# Patient Record
Sex: MALE
[De-identification: names, ages, dates, MRNs, and addresses within clinical notes are randomized per-mention and may not be internally consistent; named-entity substitution may affect disease eponyms.]

---

## 2017-04-01 ENCOUNTER — HOSPITAL ENCOUNTER (EMERGENCY)
Dept: HOSPITAL 1 - ED | Age: 37
Discharge: HOME | End: 2017-04-01
Payer: COMMERCIAL

## 2017-04-01 VITALS — DIASTOLIC BLOOD PRESSURE: 77 MMHG | SYSTOLIC BLOOD PRESSURE: 111 MMHG

## 2017-04-01 DIAGNOSIS — N20.1: Primary | ICD-10-CM

## 2017-04-01 DIAGNOSIS — K57.92: ICD-10-CM

## 2017-04-01 DIAGNOSIS — Z79.899: ICD-10-CM

## 2017-04-01 DIAGNOSIS — I10: ICD-10-CM

## 2017-04-01 DIAGNOSIS — E11.9: ICD-10-CM

## 2017-04-01 DIAGNOSIS — Z88.8: ICD-10-CM

## 2017-04-01 LAB
ALBUMIN SERPL-MCNC: 4 G/DL (ref 3.4–5)
ALP SERPL-CCNC: 68 U/L (ref 46–116)
ALT SERPL-CCNC: 65 U/L (ref 16–63)
AST SERPL-CCNC: 18 U/L (ref 15–37)
BASOPHILS NFR BLD: 0.4 % (ref 0–2)
BILIRUB SERPL-MCNC: 0.56 MG/DL (ref 0.2–1)
BUN SERPL-MCNC: 16 MG/DL (ref 7–18)
CALCIUM SERPL-MCNC: 9.1 MG/DL (ref 8.5–10.1)
CHLORIDE SERPL-SCNC: 103 MMOL/L (ref 98–107)
CO2 SERPL-SCNC: 25.3 MMOL/L (ref 21–32)
CREAT SERPL-MCNC: 1.3 MG/DL (ref 0.7–1.3)
ERYTHROCYTE [DISTWIDTH] IN BLOOD BY AUTOMATED COUNT: 12.6 % (ref 11.5–14.5)
GFR SERPLBLD BASED ON 1.73 SQ M-ARVRAT: > 60 ML/MIN
GLUCOSE SERPL-MCNC: 153 MG/DL (ref 74–106)
MICROSCOPIC UR-IMP: YES
PLATELET # BLD: 417 X10^3MCL (ref 130–400)
POTASSIUM SERPL-SCNC: 3.9 MMOL/L (ref 3.5–5.1)
PROT SERPL-MCNC: 7.8 G/DL (ref 6.4–8.2)
RBC # UR STRIP.AUTO: (no result) /UL
SODIUM SERPL-SCNC: 139 MMOL/L (ref 136–145)
UA SPECIFIC GRAVITY: 1.02 (ref 1–1.03)

## 2017-04-09 ENCOUNTER — HOSPITAL ENCOUNTER (INPATIENT)
Dept: HOSPITAL 1 - ED | Age: 37
LOS: 3 days | Discharge: HOME | DRG: 391 | End: 2017-04-12
Attending: FAMILY MEDICINE | Admitting: FAMILY MEDICINE
Payer: COMMERCIAL

## 2017-04-09 VITALS
BODY MASS INDEX: 32.99 KG/M2 | WEIGHT: 210.17 LBS | WEIGHT: 210.17 LBS | HEIGHT: 67 IN | HEIGHT: 67 IN | BODY MASS INDEX: 32.99 KG/M2

## 2017-04-09 DIAGNOSIS — E83.51: ICD-10-CM

## 2017-04-09 DIAGNOSIS — D47.3: ICD-10-CM

## 2017-04-09 DIAGNOSIS — G47.30: ICD-10-CM

## 2017-04-09 DIAGNOSIS — R80.8: ICD-10-CM

## 2017-04-09 DIAGNOSIS — N39.0: ICD-10-CM

## 2017-04-09 DIAGNOSIS — E43: ICD-10-CM

## 2017-04-09 DIAGNOSIS — I16.0: ICD-10-CM

## 2017-04-09 DIAGNOSIS — R31.9: ICD-10-CM

## 2017-04-09 DIAGNOSIS — K57.20: Primary | ICD-10-CM

## 2017-04-09 DIAGNOSIS — Z87.891: ICD-10-CM

## 2017-04-09 DIAGNOSIS — Z88.8: ICD-10-CM

## 2017-04-09 DIAGNOSIS — N13.1: ICD-10-CM

## 2017-04-09 DIAGNOSIS — N13.4: ICD-10-CM

## 2017-04-09 DIAGNOSIS — E11.65: ICD-10-CM

## 2017-04-09 DIAGNOSIS — Z90.49: ICD-10-CM

## 2017-04-09 DIAGNOSIS — K76.0: ICD-10-CM

## 2017-04-09 DIAGNOSIS — N17.0: ICD-10-CM

## 2017-04-09 LAB
ALBUMIN SERPL-MCNC: 3.5 G/DL (ref 3.4–5)
ALP SERPL-CCNC: 64 U/L (ref 46–116)
ALT SERPL-CCNC: 38 U/L (ref 16–63)
AMYLASE SERPL-CCNC: 30 U/L (ref 25–115)
AST SERPL-CCNC: 10 U/L (ref 15–37)
BASOPHILS NFR BLD: 0.1 % (ref 0–2)
BILIRUB SERPL-MCNC: 0.57 MG/DL (ref 0.2–1)
BUN SERPL-MCNC: 16 MG/DL (ref 7–18)
CALCIUM SERPL-MCNC: 9.1 MG/DL (ref 8.5–10.1)
CHLORIDE SERPL-SCNC: 100 MMOL/L (ref 98–107)
CO2 SERPL-SCNC: 27.7 MMOL/L (ref 21–32)
CREAT SERPL-MCNC: 1.6 MG/DL (ref 0.7–1.3)
ERYTHROCYTE [DISTWIDTH] IN BLOOD BY AUTOMATED COUNT: 12.9 % (ref 11.5–14.5)
GFR SERPLBLD BASED ON 1.73 SQ M-ARVRAT: 51 ML/MIN
GLUCOSE SERPL-MCNC: 216 MG/DL (ref 74–106)
LIPASE SERPL-CCNC: 180 IU/L (ref 73–393)
MICROSCOPIC UR-IMP: YES
PLATELET # BLD: 420 X10^3MCL (ref 130–400)
POTASSIUM SERPL-SCNC: 4 MMOL/L (ref 3.5–5.1)
PROT SERPL-MCNC: 7.7 G/DL (ref 6.4–8.2)
RBC # UR STRIP.AUTO: (no result) /UL
SODIUM SERPL-SCNC: 136 MMOL/L (ref 136–145)
UA SPECIFIC GRAVITY: >=1.03 (ref 1–1.03)

## 2017-04-09 NOTE — NUR
PT AAOX4, ON GURNEY IN POSITION OF COMFORT. PT REPORTS RLQ PAIN. RESP EVEN AND
UNLABORED. SPS AT BEDSIDE, CALL LIGHT WITHIN REACH, WILL CONTINUE TO MONITOR.

## 2017-04-09 NOTE — NUR
RECEIVED A 37 YEAR OLD MALE IN ROOM 8 WITH C/O RIGHT LOWER QUADRANT PAIN SINCE
WEDNESDAY. PT DENIES VOMITING AND NAUSEA AT THIS TIME. HOWEVER REPORTS LOOSE
STOOLS. 
 PT AAOX4, RESP EVEN AND UNLABORED. FAMILY AT BEDSIDE. ON FULL CM.
 CALL LIGHT WITHIN REACH. WILL CONTINUE TO MONITOR.

## 2017-04-09 NOTE — NUR
PT ALERT AND ORIENTED. RESP EVEN AND UNLABORED. DENIES PAIN AT THIS TIME. SPS
AT BEDSIDE. WILL CONTINUE TO MONITOR.

## 2017-04-09 NOTE — NUR
PT ON CAROL, IN POSITION OF COMFORT. AAOX4, RESP EVEN AND UNLABORED. SPS AT
BEDSIDE. CALL LIGHT WITHIN REACH, WILL CONTINUE TO MONITOR.

## 2017-04-09 NOTE — NUR
PT AAOX4, RESP EVEN AND UNLABORED SPOUSE AT BEDSIDE. [facility],
WILL CONTINUE TO MONITOR.

## 2017-04-10 VITALS — DIASTOLIC BLOOD PRESSURE: 80 MMHG | SYSTOLIC BLOOD PRESSURE: 129 MMHG

## 2017-04-10 VITALS — DIASTOLIC BLOOD PRESSURE: 88 MMHG | SYSTOLIC BLOOD PRESSURE: 135 MMHG

## 2017-04-10 VITALS — DIASTOLIC BLOOD PRESSURE: 79 MMHG | SYSTOLIC BLOOD PRESSURE: 128 MMHG

## 2017-04-10 VITALS — SYSTOLIC BLOOD PRESSURE: 137 MMHG | DIASTOLIC BLOOD PRESSURE: 89 MMHG

## 2017-04-10 VITALS — DIASTOLIC BLOOD PRESSURE: 92 MMHG | SYSTOLIC BLOOD PRESSURE: 146 MMHG

## 2017-04-10 LAB
BASOPHILS NFR BLD: 0.1 % (ref 0–2)
BUN SERPL-MCNC: 16 MG/DL (ref 7–18)
CALCIUM SERPL-MCNC: 8.4 MG/DL (ref 8.5–10.1)
CHLORIDE SERPL-SCNC: 105 MMOL/L (ref 98–107)
CHOLEST SERPL-MCNC: 103 MG/DL (ref ?–200)
CHOLEST/HDLC SERPL: 2.7 MG/DL
CO2 SERPL-SCNC: 24.7 MMOL/L (ref 21–32)
CREAT SERPL-MCNC: 1.4 MG/DL (ref 0.7–1.3)
ERYTHROCYTE [DISTWIDTH] IN BLOOD BY AUTOMATED COUNT: 13 % (ref 11.5–14.5)
GFR SERPLBLD BASED ON 1.73 SQ M-ARVRAT: 60 ML/MIN
GLUCOSE SERPL-MCNC: 160 MG/DL (ref 74–106)
HDLC SERPL-MCNC: 38 MG/DL (ref 40–60)
LDH SERPL-CCNC: 141 U/L (ref 100–190)
MAGNESIUM SERPL-MCNC: 1.9 MG/DL (ref 1.8–2.4)
MAGNESIUM SERPL-MCNC: 1.9 MG/DL (ref 1.8–2.4)
PHOSPHATE SERPL-MCNC: 3.2 MG/DL (ref 2.5–4.9)
PHOSPHATE SERPL-MCNC: 3.6 MG/DL (ref 2.5–4.9)
PLATELET # BLD: 440 X10^3MCL (ref 130–400)
POTASSIUM SERPL-SCNC: 4 MMOL/L (ref 3.5–5.1)
SODIUM SERPL-SCNC: 138 MMOL/L (ref 136–145)
T3 SERPL-MCNC: 0.71 NG/ML
T3RU NFR SERPL: 37 % UPTAKE (ref 33–39)
T4 FREE SERPL-MCNC: 0.98 NG/DL (ref 0.76–1.46)
T4 SERPL-MCNC: 6 UG/DL (ref 4.7–13.3)
T4/T3 UPTAKE INDEX SERPL: 2.2 UG/DL (ref 1.4–4.5)
TRIGL SERPL-MCNC: 61 MG/DL (ref ?–150)

## 2017-04-10 NOTE — NUR
PATIENT OPTED OUT OF HAVING INSULIN  BLOOD SUGAR. HE IS CONCERNED THIS
MAY LOWER TOO MUCH HIS BLOOD SUGAR. REMAINS NPO AS ORDERED.

## 2017-04-10 NOTE — NUR
CONTINUED TO STRAIN FOR STONES AND SO FAR NONE NOTED. PATIENT HAS HAD TWO BMS
AND HAS INTERMITTANT PAIN NOT IN THE GUT BUT IN THE FLANK AREA AND BELIEVES IT
IS THE KIDNEY STONE CAUSING PAIN AND NOT THE DIVERTICULI.

## 2017-04-10 NOTE — NUR
RECEIVED PATIENT ALERT AND ORIENTED TIMES FOUR. PATIENT WITH IV ITNACT AND
SKIN IS WARM AND DRY. PATIENT DENIES PAIN AT THIST ANASTASIIA. SOME TRACE EDEMA TO
THE LOWER EXTREMTIES NOTED. AND PATIENT HAS BEEN STRAINING URINE AND URINE IS
[place] IN COLOR. SO FAR NO STONES NOTED. PATIENT HAS BEEN NPO AS ORDERE
FOR POSSIBLE PROCEDURE AND CONSULTATION WITH DR PERRY FOR DIVERTICULI. VITALS
ATH IS TIME AT 98.6, 83, 20, 137/89, 98%. LAST BLOOD SUGAR  AND NO
COVERAGE GIVEN AND PATIENT IS NPO AT THIS TIME. CONTINUED ON FLAGYL AND
LEVAQUIN AND NO ADVERSE REACTION. PATIEN TIWTH HISTORY OF SLEEP APNEA,
DIABETES, CHOLECYSTECTOMY, DIVERTICULITIS. PATIENT IN NO ACUTE DISTRESS AT
THIS TIME.

## 2017-04-10 NOTE — NUR
RECEIVED PT FROM ED [facility]IA [employer]. CAME IN DUE TO RLQ ABDOMINAL PAIN. AAOX4. NO
SOB NOTED. DENIES CHEST PAIN/PRESSURE, NSR ON THE MONITOR. DENIES ABDOMINAL
DISCOMFORT. HAS LOOSE STOOLS. VOIDS FREELY. IV SITE PATENT AND INTACT. SIDE
RAILS UPX2. CALL LIGHT ON REACH. ENDORSED TO PRIMARY NURSE MARKEL

## 2017-04-10 NOTE — NUR
SEEN IN BED STS PAIN TO RLQ IS 0/10 ON PAIN SCALE AFTER PAIN MEDS GIVEN IN ER.
DENIES NAUSEA. NPO X MEDS INFORMED. FLAGYL CONTINUED FROM ER, IV ACCESS TO LAC
INTACT AND PATENT.

## 2017-04-10 NOTE — NUR
NO ACUTE DISTRESS NOTED THROUGHOUT THE SHIFT. STS PAIN TO RLQ IS TOLERABLE, NO
PAIN MEDS GIVEN AFTER CAME UP FROM ER. NPO X MEDS. DUE ABX-FLAGYL AND LEVAQUIN
GIVEN. IVF D5NS CONTINUED AT 150ML/HR.

## 2017-04-10 NOTE — NUR
RESTING IN BED, NO ANY DISTRESS NOTED ON ROOM AIR. CPAP AT BEDSIDE PER RT STS
DOES NOT NEED IT AT THIS TIME. DOCTOR PAULY WENT IN TO THE ROOM MADE AWARE.
LEVAQUIN 500MG IVPB INFUSING WELL. URINAL PROVIDED, WILL STRAIN ALL URINE.
PLAN OF CARE DISCUSSED. PATIENT VERBALIZED UNDERSTANDING. IVF D5NS AT 150ML/HR
INFUSING.

## 2017-04-10 NOTE — NUR
CALLED INTERN FOR ORDERS AND PATIENT IS TO REMAIN NPO FOR GI TO SEE AS
PREVIOUSLY ORDERS. ADVISED THE PATIENT AND WILL CONTINUE NPO STATUS AT THIS
TIME.

## 2017-04-11 VITALS — SYSTOLIC BLOOD PRESSURE: 133 MMHG | DIASTOLIC BLOOD PRESSURE: 89 MMHG

## 2017-04-11 VITALS — SYSTOLIC BLOOD PRESSURE: 135 MMHG | DIASTOLIC BLOOD PRESSURE: 84 MMHG

## 2017-04-11 VITALS — SYSTOLIC BLOOD PRESSURE: 117 MMHG | DIASTOLIC BLOOD PRESSURE: 64 MMHG

## 2017-04-11 VITALS — DIASTOLIC BLOOD PRESSURE: 88 MMHG | SYSTOLIC BLOOD PRESSURE: 130 MMHG

## 2017-04-11 LAB
ALBUMIN SERPL-MCNC: 2.9 G/DL (ref 3.4–5)
BASOPHILS NFR BLD: 0.1 % (ref 0–2)
BUN SERPL-MCNC: 11 MG/DL (ref 7–18)
CALCIUM SERPL-MCNC: 8.5 MG/DL (ref 8.5–10.1)
CHLORIDE SERPL-SCNC: 105 MMOL/L (ref 98–107)
CO2 SERPL-SCNC: 26.2 MMOL/L (ref 21–32)
CREAT SERPL-MCNC: 1.2 MG/DL (ref 0.7–1.3)
ERYTHROCYTE [DISTWIDTH] IN BLOOD BY AUTOMATED COUNT: 12.9 % (ref 11.5–14.5)
GFR SERPLBLD BASED ON 1.73 SQ M-ARVRAT: > 60 ML/MIN
GLUCOSE SERPL-MCNC: 143 MG/DL (ref 74–106)
PLATELET # BLD: 459 X10^3MCL (ref 130–400)
POTASSIUM SERPL-SCNC: 3.7 MMOL/L (ref 3.5–5.1)
SODIUM SERPL-SCNC: 139 MMOL/L (ref 136–145)

## 2017-04-11 NOTE — NUR
Awake and verbally responsive. No resp.distress noted. Denies pain at this
time. Denies n/v or diarrhea or dysuria. Will cont.to monitor. Call light
within reach.

## 2017-04-11 NOTE — NUR
PATIENT IS RESTING IN BED. NO DISTRESS NOTED. IV FLUID INFUSING WELL. CALL
LIGHT WITHIN REACH. WILL CONTINUE TO MONITOR.

## 2017-04-11 NOTE — NUR
REASSESSMENT DONE. PT AWAKE, COOPERATIVE OF CARE. ON TELE. PALPABLE PULSES TO
ALL EXTREMITIES. DENIES PAIN AT MOMENT. PT IS AMBULATORY. IV INFUSING WELL TO
LAC D5NS AT 150ML/HR. CALL LIGHT WITHIN REACH. WILL CONTINUE TO MONITOR.

## 2017-04-11 NOTE — NUR
PASSED AFTERNOON MEDS. PT TOLERATED WELL. BLOOD SUGAR CHECK  NO INSULIN
COVERAGE NEEDED. PT SITTING IN BEDSIDE CHAIR TALKING TO FAMILY MEMBERS. NO
DISTRESS, DENIES PAIN. CALL LIGHT WITHIN REACH.

## 2017-04-12 VITALS — DIASTOLIC BLOOD PRESSURE: 78 MMHG | SYSTOLIC BLOOD PRESSURE: 120 MMHG

## 2017-04-12 VITALS — SYSTOLIC BLOOD PRESSURE: 141 MMHG | DIASTOLIC BLOOD PRESSURE: 92 MMHG

## 2017-04-12 LAB
ALBUMIN SERPL-MCNC: 2.8 G/DL (ref 3.4–5)
BASOPHILS NFR BLD: 0.3 % (ref 0–2)
BUN SERPL-MCNC: 9 MG/DL (ref 7–18)
CALCIUM SERPL-MCNC: 8.5 MG/DL (ref 8.5–10.1)
CHLORIDE SERPL-SCNC: 106 MMOL/L (ref 98–107)
CO2 SERPL-SCNC: 25.6 MMOL/L (ref 21–32)
CREAT SERPL-MCNC: 1.3 MG/DL (ref 0.7–1.3)
ERYTHROCYTE [DISTWIDTH] IN BLOOD BY AUTOMATED COUNT: 13 % (ref 11.5–14.5)
GFR SERPLBLD BASED ON 1.73 SQ M-ARVRAT: > 60 ML/MIN
GLUCOSE SERPL-MCNC: 163 MG/DL (ref 74–106)
PLATELET # BLD: 471 X10^3MCL (ref 130–400)
POTASSIUM SERPL-SCNC: 3.9 MMOL/L (ref 3.5–5.1)
SODIUM SERPL-SCNC: 141 MMOL/L (ref 136–145)

## 2017-04-12 NOTE — NUR
PATIENT AOX4, DENIES HEADACHE. NO TELE, DENIES CP. LUNGS CTA, NO RESP DISTRESS
NOTED ON RA. PERIPHERAL PULSES PALPABLE, NO EDEMA. BOWEL SOUNDS ACTIVE, LAST
STATED YESTERDAY, DENIES ANY GI DISCOMFORT. LAST VOID STATED THIS MORNING
STRAINED BUT NO STONES PASSED YET. DENIES PAIN. SKIN INTACT. IV ACCESS TO LAC
RUNNING D5NS INFUSING WELL SITE WNL. CALL LIGHT WITHIN REACH.

## 2017-04-12 NOTE — NUR
PATIENT TOLERATED LUNCH MEAL WELL, DENIES PAIN AT THIS TIME. DISCHARGE
INSTRUCTIONS AND PRESCRIPTION GIVEN, PATIENT VERBALIZED UNDERSTANDING. IV DC'D
CATH INTACT. WAITING FOR RIDE.

## 2017-04-12 NOTE — NUR
DR DOLAN AND TEAM AT BEDSIDE, INFORMED PATIENT HE MAY BE DISCHARGED
LATER TODAY. COOPERATIVE WITH PLAN OF CARE.

## 2017-04-12 NOTE — NUR
Afebrile. No significant change in condition noted. Pain controlled. No n/v
noted. Denies hematuria or dysuria. Cont.on IV rocephin, flagyl. In no
apparent distress.

## 2017-06-23 ENCOUNTER — HOSPITAL ENCOUNTER (OUTPATIENT)
Dept: HOSPITAL 1 - DS | Age: 37
Discharge: HOME | End: 2017-06-23
Attending: INTERNAL MEDICINE
Payer: COMMERCIAL

## 2017-06-23 VITALS — DIASTOLIC BLOOD PRESSURE: 82 MMHG | SYSTOLIC BLOOD PRESSURE: 123 MMHG

## 2017-06-23 VITALS — WEIGHT: 209.99 LBS | HEIGHT: 67 IN | BODY MASS INDEX: 32.96 KG/M2

## 2017-06-23 VITALS — SYSTOLIC BLOOD PRESSURE: 113 MMHG | DIASTOLIC BLOOD PRESSURE: 70 MMHG

## 2017-06-23 DIAGNOSIS — K64.8: ICD-10-CM

## 2017-06-23 DIAGNOSIS — K57.92: Primary | ICD-10-CM

## 2017-06-23 DIAGNOSIS — K57.30: ICD-10-CM

## 2017-06-23 PROCEDURE — 0DJD8ZZ INSPECTION OF LOWER INTESTINAL TRACT, VIA NATURAL OR ARTIFICIAL OPENING ENDOSCOPIC: ICD-10-PCS | Performed by: INTERNAL MEDICINE

## 2018-01-16 NOTE — NUR
Pt ambulated into ED c/o loud high pitched ringing in R ear. Pt states he woke 
up this morning having muffled hearing to R ear which gradually changed to 
ringing. Pt reports the ringing has became louder since checking into ED. Pt 
denies LOC, N/V. Wife at bedside. No other injuries/complaints per pt/noted. 
Will continue to monitor.

## 2018-01-16 NOTE — NUR
Patient to ER bed HALLWAY to [facility] for evaluation. Side rails up.

Report given to AMISHA BOJORQUEZ.

## 2018-01-16 NOTE — NUR
Patient given written and verbal discharge instructions and verbalizes 
understanding.  ER MD Abreu discussed with patient the results and treatment 
provided. Patient in stable condition. ID arm band removed. Rx of Biaxin 
Filmtab given. Patient educated on pain management and to follow up with PMD. 
Pain Scale 0. Opportunity for questions provided and answered.

## 2018-03-20 NOTE — NUR
RECEIVED PATIENT RESTING IN BED. PATIENT IS ALERT, AWAKE, AND ORIENTED X4.
TELE #16 SINUS RHYTHM. LUNG SOUNDS CLEAR. NO SHORTNESS OF BREATH NOTED. BOWEL
SOUNDS ACTIVE. PATIENT DENIES ABD PAIN OR N/V. PATIENT VOIDING FREELY.
STRAINING URINE FOR STONES. IV FLUID INFUSING TO LEFT ANTECUBITAL PER DOCTOR'S
ORDER. INTACT AND PATENT. SAFETY AND COMFORT MEASURES IN PLACE. BED IN LOWEST
POSITION. CALL LIGHT WITHIN REACH. WILL CONTINUE TO MONITOR. Never smoker

## 2020-03-07 NOTE — NUR
Patient given written and verbal discharge instructions and verbalizes 
understanding. ER MD Joseph discussed with patient the results and treatment 
provided. Patient in stable condition. ID arm band removed. Rx of Tramadol and 
Naprosyn given. Patient educated on pain management and to follow up with PMD. 
Pain Scale 0/10. Opportunity for questions provided and answered. Medication 
side effect fact sheet provided.
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
## 2020-11-21 NOTE — NUR
Patient presented to ER C/O of kidney stones. Patient ambulatory to ER, A&Ox4, 
afebrile, skin pink & warm, pain 8/10. Patient states he has Kidney stone 
symptoms  x7 days. Patient report today flank pain; urethra pain today but now 
resolved.

Patient reports HX of kiney stones.

## 2020-11-21 NOTE — NUR
Patient given written and verbal discharge instructions and verbalizes 
understanding.  ER MD discussed with patient the results and treatment 
provided. Patient in stable condition. ID arm band removed. IV catheter removed 
intact and dressing applied, no active bleeding.

Rx of flowmax given. Patient educated on pain management and to follow up with 
PMD. Pain Scale 0/10.

Opportunity for questions provided and answered. Medication side effect fact 
sheet provided.

## 2020-11-21 NOTE — NUR
-------------------------------------------------------------------------------

            *** Note undone in EDM - 11/21/20 at 1145 by SDEDTD ***            

-------------------------------------------------------------------------------

Patient given written and verbal discharge instructions and verbalizes 
understanding.  ER MD discussed with patient the results and treatment 
provided. Patient in stable condition. ID arm band removed. 

Rx of Flowmax given. Patient educated on pain management and to follow up with 
PMD. Pain Scale 0/10.

Opportunity for questions provided and answered. Medication side effect fact 
sheet provided.

## 2021-01-03 ENCOUNTER — HOSPITAL ENCOUNTER (EMERGENCY)
Dept: HOSPITAL 4 - SED | Age: 41
Discharge: HOME | End: 2021-01-03
Payer: COMMERCIAL

## 2021-01-03 VITALS — BODY MASS INDEX: 32.02 KG/M2 | HEIGHT: 67 IN | WEIGHT: 204 LBS | SYSTOLIC BLOOD PRESSURE: 132 MMHG

## 2021-01-03 DIAGNOSIS — Z79.899: ICD-10-CM

## 2021-01-03 DIAGNOSIS — E11.9: ICD-10-CM

## 2021-01-03 DIAGNOSIS — Z88.8: ICD-10-CM

## 2021-01-03 DIAGNOSIS — U07.1: Primary | ICD-10-CM

## 2021-01-03 DIAGNOSIS — I10: ICD-10-CM

## 2021-01-03 DIAGNOSIS — Z79.84: ICD-10-CM

## 2021-01-03 PROCEDURE — C9803 HOPD COVID-19 SPEC COLLECT: HCPCS

## 2021-01-03 PROCEDURE — 86710 INFLUENZA VIRUS ANTIBODY: CPT

## 2021-01-03 PROCEDURE — 87426 SARSCOV CORONAVIRUS AG IA: CPT

## 2021-01-03 PROCEDURE — 71045 X-RAY EXAM CHEST 1 VIEW: CPT

## 2021-01-03 PROCEDURE — 99284 EMERGENCY DEPT VISIT MOD MDM: CPT

## 2021-01-03 PROCEDURE — U0003 INFECTIOUS AGENT DETECTION BY NUCLEIC ACID (DNA OR RNA); SEVERE ACUTE RESPIRATORY SYNDROME CORONAVIRUS 2 (SARS-COV-2) (CORONAVIRUS DISEASE [COVID-19]), AMPLIFIED PROBE TECHNIQUE, MAKING USE OF HIGH THROUGHPUT TECHNOLOGIES AS DESCRIBED BY CMS-2020-01-R: HCPCS
